# Patient Record
Sex: FEMALE | Race: OTHER | HISPANIC OR LATINO | Employment: FULL TIME | URBAN - METROPOLITAN AREA
[De-identification: names, ages, dates, MRNs, and addresses within clinical notes are randomized per-mention and may not be internally consistent; named-entity substitution may affect disease eponyms.]

---

## 2018-01-12 NOTE — RESULT NOTES
Verified Results  (1) TSH [id number] 02:23PM Judy Diehl   Patients undergoing fluorescein dye angiography may retain small amounts of fluorescein in the body for 48-72 hours post procedure  Samples containing fluorescein can produce falsely depressed TSH values  If the patient had this procedure,a specimen should be resubmitted post fluorescein clearance  The recommended reference ranges for TSH during pregnancy are as follows:  First trimester 0 1 to 2 5 uIU/mL  Second trimester  0 2 to 3 0 uIU/mL  Third trimester 0 3 to 3 0 uIU/m     Test Name Result Flag Reference   TSH 2 260 uIU/mL  0 358-3 740     (1) CBC/PLT/DIFF [id number] 02:23PM Judy Diehl     Test Name Result Flag Reference   WBC COUNT 8 53 Thousand/uL  4 31-10 16   RBC COUNT 4 41 Million/uL  3 81-5 12   HEMOGLOBIN 12 2 g/dL  11 5-15 4   HEMATOCRIT 38 5 %  34 8-46  1   MCV 87 fL  82-98   MCH 27 7 pg  26 8-34 3   MCHC 31 7 g/dL  31 4-37 4   RDW 12 8 %  11 6-15 1   MPV 9 8 fL  8 9-12 7   PLATELET COUNT 731 Thousands/uL H 149-390   nRBC AUTOMATED 0 /100 WBCs     NEUTROPHILS RELATIVE PERCENT 62 %  43-75   LYMPHOCYTES RELATIVE PERCENT 30 %  14-44   MONOCYTES RELATIVE PERCENT 7 %  4-12   EOSINOPHILS RELATIVE PERCENT 1 %  0-6   BASOPHILS RELATIVE PERCENT 0 %  0-1   NEUTROPHILS ABSOLUTE COUNT 5 27 Thousands/µL  1 85-7 62   LYMPHOCYTES ABSOLUTE COUNT 2 52 Thousands/µL  0 60-4 47   MONOCYTES ABSOLUTE COUNT 0 57 Thousand/µL  0 17-1 22   EOSINOPHILS ABSOLUTE COUNT 0 12 Thousand/µL  0 00-0 61   BASOPHILS ABSOLUTE COUNT 0 03 Thousands/µL  0 00-0 10

## 2018-03-15 PROCEDURE — 87624 HPV HI-RISK TYP POOLED RSLT: CPT | Performed by: OBSTETRICS & GYNECOLOGY

## 2018-03-15 PROCEDURE — G0145 SCR C/V CYTO,THINLAYER,RESCR: HCPCS | Performed by: OBSTETRICS & GYNECOLOGY

## 2018-03-17 ENCOUNTER — LAB REQUISITION (OUTPATIENT)
Dept: LAB | Facility: HOSPITAL | Age: 33
End: 2018-03-17
Payer: COMMERCIAL

## 2018-03-17 DIAGNOSIS — Z11.51 ENCOUNTER FOR SCREENING FOR HUMAN PAPILLOMAVIRUS (HPV): ICD-10-CM

## 2018-03-17 DIAGNOSIS — Z01.419 ENCOUNTER FOR GYNECOLOGICAL EXAMINATION WITHOUT ABNORMAL FINDING: ICD-10-CM

## 2018-03-20 LAB — HPV RRNA GENITAL QL NAA+PROBE: ABNORMAL

## 2018-03-21 LAB
LAB AP GYN PRIMARY INTERPRETATION: NORMAL
Lab: NORMAL

## 2019-06-28 ENCOUNTER — HOSPITAL ENCOUNTER (EMERGENCY)
Facility: HOSPITAL | Age: 34
Discharge: HOME | End: 2019-06-29
Attending: EMERGENCY MEDICINE
Payer: COMMERCIAL

## 2019-06-28 VITALS
TEMPERATURE: 97.7 F | RESPIRATION RATE: 18 BRPM | OXYGEN SATURATION: 97 % | BODY MASS INDEX: 31.15 KG/M2 | DIASTOLIC BLOOD PRESSURE: 75 MMHG | HEART RATE: 64 BPM | SYSTOLIC BLOOD PRESSURE: 126 MMHG | WEIGHT: 198.85 LBS

## 2019-06-28 DIAGNOSIS — M54.41 ACUTE RIGHT-SIDED LOW BACK PAIN WITH RIGHT-SIDED SCIATICA: Primary | ICD-10-CM

## 2019-06-28 LAB
BILIRUB UR QL STRIP: NEGATIVE
CLARITY UR: CLEAR
COLOR UR: YELLOW
EXT PREG TEST URINE: NEGATIVE
EXT. CONTROL ED NAV: NORMAL
GLUCOSE UR STRIP-MCNC: NEGATIVE MG/DL
HGB UR QL STRIP.AUTO: ABNORMAL
KETONES UR STRIP-MCNC: NEGATIVE MG/DL
LEUKOCYTE ESTERASE UR QL STRIP: NEGATIVE
NITRITE UR QL STRIP: NEGATIVE
PH UR STRIP.AUTO: 7 [PH] (ref 4.5–8)
PROT UR STRIP-MCNC: NEGATIVE MG/DL
SP GR UR STRIP.AUTO: 1.02 (ref 1–1.03)
UROBILINOGEN UR QL STRIP.AUTO: 0.2 E.U./DL

## 2019-06-28 PROCEDURE — 81003 URINALYSIS AUTO W/O SCOPE: CPT

## 2019-06-28 PROCEDURE — 87086 URINE CULTURE/COLONY COUNT: CPT | Performed by: EMERGENCY MEDICINE

## 2019-06-28 PROCEDURE — 96372 THER/PROPH/DIAG INJ SC/IM: CPT

## 2019-06-28 PROCEDURE — 99283 EMERGENCY DEPT VISIT LOW MDM: CPT

## 2019-06-28 PROCEDURE — 81001 URINALYSIS AUTO W/SCOPE: CPT

## 2019-06-28 PROCEDURE — 81025 URINE PREGNANCY TEST: CPT | Performed by: EMERGENCY MEDICINE

## 2019-06-28 RX ORDER — DIAZEPAM 5 MG/ML
5 INJECTION, SOLUTION INTRAMUSCULAR; INTRAVENOUS ONCE
Status: COMPLETED | OUTPATIENT
Start: 2019-06-28 | End: 2019-06-28

## 2019-06-28 RX ORDER — ACETAMINOPHEN 325 MG/1
975 TABLET ORAL ONCE
Status: COMPLETED | OUTPATIENT
Start: 2019-06-28 | End: 2019-06-28

## 2019-06-28 RX ORDER — LIDOCAINE 50 MG/G
1 PATCH TOPICAL ONCE
Status: DISCONTINUED | OUTPATIENT
Start: 2019-06-28 | End: 2019-06-29 | Stop reason: HOSPADM

## 2019-06-28 RX ORDER — KETOROLAC TROMETHAMINE 30 MG/ML
30 INJECTION, SOLUTION INTRAMUSCULAR; INTRAVENOUS ONCE
Status: COMPLETED | OUTPATIENT
Start: 2019-06-28 | End: 2019-06-28

## 2019-06-28 RX ADMIN — ACETAMINOPHEN 975 MG: 325 TABLET ORAL at 23:45

## 2019-06-28 RX ADMIN — DIAZEPAM 5 MG: 5 INJECTION, SOLUTION INTRAMUSCULAR; INTRAVENOUS at 23:48

## 2019-06-28 RX ADMIN — LIDOCAINE 1 PATCH: 50 PATCH TOPICAL at 23:45

## 2019-06-28 RX ADMIN — KETOROLAC TROMETHAMINE 30 MG: 30 INJECTION, SOLUTION INTRAMUSCULAR; INTRAVENOUS at 23:46

## 2019-06-29 LAB
BACTERIA UR QL AUTO: ABNORMAL /HPF
NON-SQ EPI CELLS URNS QL MICRO: ABNORMAL /HPF
RBC #/AREA URNS AUTO: ABNORMAL /HPF
WBC #/AREA URNS AUTO: ABNORMAL /HPF

## 2019-06-29 PROCEDURE — 99284 EMERGENCY DEPT VISIT MOD MDM: CPT | Performed by: EMERGENCY MEDICINE

## 2019-06-29 RX ORDER — METHOCARBAMOL 500 MG/1
500 TABLET, FILM COATED ORAL 2 TIMES DAILY
Qty: 20 TABLET | Refills: 0 | Status: SHIPPED | OUTPATIENT
Start: 2019-06-29

## 2019-06-29 RX ORDER — LIDOCAINE 40 MG/G
CREAM TOPICAL AS NEEDED
Qty: 30 G | Refills: 0 | Status: SHIPPED | OUTPATIENT
Start: 2019-06-29

## 2019-06-29 RX ORDER — LIDOCAINE 50 MG/G
1 PATCH TOPICAL DAILY
Qty: 30 PATCH | Refills: 0 | Status: SHIPPED | OUTPATIENT
Start: 2019-06-29

## 2019-06-29 RX ORDER — NAPROXEN 500 MG/1
500 TABLET ORAL 2 TIMES DAILY WITH MEALS
Qty: 30 TABLET | Refills: 0 | Status: SHIPPED | OUTPATIENT
Start: 2019-06-29

## 2019-06-29 NOTE — ED PROVIDER NOTES
History  Chief Complaint   Patient presents with    Back Pain     patient c/o lower back pain since tuesday, no injury or trauma noted  Last dose of motrin at 12pm      Pt is a 27year old female with a PMH of thyroid carcinoma presenting with low back pain x 4 days  Pt denies injury but has had severe 10/10 right sided low back pain that radiates to her right leg  She denies paresthesias  Denies fevers, chills, IV drug abuse, loss of bowel or bladder, saddle anesthesia  She has been taking Tylenol without relief  States she is active at work and this has been worsening her pain  Denies urinary symptoms  Denies weight loss  None       Past Medical History:   Diagnosis Date    Thyroid cancer [facility])        Past Surgical History:   Procedure Laterality Date    THYROIDECTOMY, PARTIAL  2017       Family History   Problem Relation Age of Onset    Prostate cancer Father     Depression Father     Diabetes Maternal Grandfather     Diabetes Paternal Grandfather      I have reviewed and agree with the history as documented  Social History     Tobacco Use    Smoking status: Never Smoker    Smokeless tobacco: Never Used   Substance Use Topics    Alcohol use: Not Currently    Drug use: Never        Review of Systems   Constitutional: Negative for chills, diaphoresis, fever and unexpected weight change  Respiratory: Negative for shortness of breath  Cardiovascular: Negative for chest pain  Gastrointestinal: Negative for diarrhea, nausea and vomiting  Genitourinary: Negative  Musculoskeletal: Positive for back pain  Negative for gait problem, neck pain and neck stiffness  Neurological: Negative for dizziness, weakness, light-headedness, numbness and headaches  Physical Exam  Physical Exam   Constitutional: She is oriented to person, place, and time  She appears well-developed and well-nourished  She appears distressed  HENT:   Head: Normocephalic and atraumatic     Eyes: Pupils are equal, round, and reactive to light  Conjunctivae and EOM are normal    Neck: Normal range of motion  Neck supple  Cardiovascular: Normal rate, regular rhythm, normal heart sounds and intact distal pulses  Pulses:       Dorsalis pedis pulses are 2+ on the right side  Pulmonary/Chest: Effort normal and breath sounds normal    Abdominal: Soft  Bowel sounds are normal  She exhibits no distension  There is no tenderness  Musculoskeletal:        Lumbar back: She exhibits decreased range of motion, tenderness and pain  She exhibits no bony tenderness, no swelling, no edema, no deformity, no laceration, no spasm and normal pulse  Back:    Neurological: She is alert and oriented to person, place, and time  No sensory deficit  Strength RLE 2/5 compared to 4/5 LLE  Sensation in tact b/l  Skin: Skin is warm and dry  Capillary refill takes less than 2 seconds  She is not diaphoretic         Vital Signs  ED Triage Vitals   Temperature Pulse Respirations Blood Pressure SpO2   06/28/19 2239 06/28/19 2239 06/28/19 2239 06/28/19 2243 06/28/19 2239   97 7 °F (36 5 °C) 64 18 126/75 97 %      Temp Source Heart Rate Source Patient Position - Orthostatic VS BP Location FiO2 (%)   06/28/19 2239 06/28/19 2239 06/28/19 2239 06/28/19 2239 --   Oral Monitor Sitting Left arm       Pain Score       06/28/19 2239       Worst Possible Pain           Vitals:    06/28/19 2239 06/28/19 2243   BP:  126/75   Pulse: 64    Patient Position - Orthostatic VS: Sitting          Visual Acuity      ED Medications  Medications   lidocaine (LIDODERM) 5 % patch 1 patch (1 patch Topical Medication Applied 6/28/19 2345)   ketorolac (TORADOL) injection 30 mg (30 mg Intramuscular Given 6/28/19 2346)   acetaminophen (TYLENOL) tablet 975 mg (975 mg Oral Given 6/28/19 2345)   diazepam (VALIUM) injection 5 mg (5 mg Intramuscular Given 6/28/19 2348)       Diagnostic Studies  Results Reviewed     Procedure Component Value Units Date/Time    Urine culture [[id number]] Collected:  06/28/19 2350    Lab Status: In process Specimen:  Urine Updated:  06/29/19 0034    Urine Microscopic [[id number]]  (Abnormal) Collected:  06/28/19 2350    Lab Status:  Final result Specimen:  Urine, Clean Catch Updated:  06/29/19 0009     RBC, UA 20-30 /hpf      WBC, UA None Seen /hpf      Epithelial Cells Occasional /hpf      Bacteria, UA Occasional /hpf     POCT urinalysis dipstick [[id number]]  (Abnormal) Resulted:  06/28/19 2344    Lab Status:  Final result Specimen:  Urine Updated:  06/28/19 2344    POCT pregnancy, urine [[id number]]  (Normal) Resulted:  06/28/19 2343    Lab Status:  Final result Updated:  06/28/19 2343     EXT PREG TEST UR (Ref: Negative) negative     Control valid    ED Urine Macroscopic [[id number]]  (Abnormal) Collected:  06/28/19 2350    Lab Status:  Final result Specimen:  Urine Updated:  06/28/19 2339     Color, UA Yellow     Clarity, UA Clear     pH, UA 7 0     Leukocytes, UA Negative     Nitrite, UA Negative     Protein, UA Negative mg/dl      Glucose, UA Negative mg/dl      Ketones, UA Negative mg/dl      Urobilinogen, UA 0 2 E U /dl      Bilirubin, UA Negative     Blood, UA Moderate     Specific Gravity, UA 1 025    Narrative:       [facility] RESULT                 No orders to display              Procedures  Procedures       ED Course                               MDM  Number of Diagnoses or Management Options  Acute right-sided low back pain with right-sided sciatica:   Diagnosis management comments: Symptoms consistent with sciatica  Comprehensive spine program, supportive care and follow up with PCP  Educated on return precautions  Stable and ready for discharge        Disposition  Final diagnoses:   Acute right-sided low back pain with right-sided sciatica     Time reflects when diagnosis was documented in both MDM as applicable and the Disposition within this note     Time User Action Codes Description Comment    6/29/2019 12:07 AM Patrica ECHEVARRIA Add [G76 61] Acute right-sided low back pain with right-sided sciatica       ED Disposition     ED Disposition Condition Date/Time Comment    Discharge Good Sat Jun 29, 2019 12:07 AM Sigifredo Hagan discharge to home/self care              Follow-up Information     Follow up With Specialties Details Why Contact Info    Judy Diehl PA-C Family Medicine, Physician Assistant Schedule an appointment as soon as possible for a visit  As needed Janina Arsenio [id number] [facility]  [phone]            Discharge Medication List as of 6/29/2019 12:09 AM      START taking these medications    Details   lidocaine (LIDODERM) 5 % Apply 1 patch topically daily Remove & Discard patch within 12 hours or as directed by MD, Starting Sat 6/29/2019, Print      lidocaine (LMX) 4 % cream Apply topically as needed for moderate pain, Starting Sat 6/29/2019, Print      methocarbamol (ROBAXIN) 500 mg tablet Take 1 tablet (500 mg total) by mouth 2 (two) times a day, Starting Sat 6/29/2019, Print      naproxen (NAPROSYN) 500 mg tablet Take 1 tablet (500 mg total) by mouth 2 (two) times a day with meals, Starting Sat 6/29/2019, Print               ED Provider  Electronically Signed by           Mp Buchanan PA-C  06/29/19 2793

## 2019-06-29 NOTE — DISCHARGE INSTRUCTIONS
Ciática   LO QUE NECESITA SABER:   La ciática es cosmo condición que causa dolor en el nervio ciático  El nervio ciático sale de la pat dorsal y corre por ambos lados de los glúteos  Luego baja por la parte posterior del muslo, la parte inferior de la pierna y el pie  El nervio ciático puede estar comprimido, [place], irritado o estirado  INSTRUCCIONES SOBRE EL [facility]ITAL[facility]:   Medicamentos:   · AINEs (analgésicos antiinflamatorios no esteroides):  Estos medicamentos disminuyen la inflamación y el dolor  Los AINEs se pueden obtener sin receta médica  Consulte con hardwick médico cuál medicamento es el adecuado para usted  Pregunte cuánto kyle y cuándo  Tómelos cem se le indique  Cuando no se shruthi de la [employer], los medicamentos antiinflamatorios no esteroides pueden causar sangrado estomacal o problemas renales  · Acetaminofeno:  Ors medicamento disminuye el dolor  El acetaminofeno puede obtener sin receta médica  Pregunte cuánto kyle y cuándo  [address]  El acetaminofén puede causar daño en el hígado cuando no se darwin de forma correcta  · Relajantes musculares  ayudan a reducir dolor y espasmos musculares  · [place] ruth ann medicamentos cem se le haya indicado  Consulte con hardwick médico si usted polly que hardwick medicamento no le está ayudando o si presenta efectos secundarios  Infórmele si es alérgico a algún medicamento  Mantenga cosmo lista actualizada de los [place], las vitaminas y los productos herbales que darwin  Incluya los siguientes datos de los medicamentos: cantidad, frecuencia y motivo de administración  Traiga con usted la lista o los envases de la píldoras a ruth ann citas de seguimiento  Lleve la lista de los medicamentos con usted en kelly de cosmo emergencia  Acuda a ruth ann consultas de control con hardwick médico según le indicaron  Anote ruth ann preguntas para que se acuerde de hacerlas ivette ruth ann visitas  El [facility] síntomas:   · Actividad:  Reduzca ruth ann actividades   No levante objetos pesados ni tuerza la espalda ivette un mínimo de 6 semanas  Retome lentamente ruth ann actividades acostumbradas  · Hielo:  El hielo ayuda a disminuir la inflamación y el dolor  El hielo también puede contribuir a evitar el daño de los tejidos  Use un paquete de hielo o ponga hielo molido dentro de [employer]  Cúbrala con cosmo toalla y colóquela en la pierna por 15 a 20 minutos cada hora o cem se le indique  · Calor:  El calor ayuda a disminuir el dolor y los espasmos musculares  Aplíquese calor en el área lesionada ivette 20 a 30 minutos cada 2 horas ivette la cantidad de [employer] indiquen  · Fisioterapia:  Es posible que deba atenderse con un fisioterapeuta para que le enseñe ejercicios para aumentar la fuerza y el movimiento y aliviar el dolor  Un terapeuta ocupacional le enseña habilidades para ayudarlo con ruth ann actividades diarias  · Use los dispositivos de asistencia dick cem le indiquen:  Es posible que deba usar un soporte para la espalda, cem cosmo Haley Harpin  Puede que necesite muletas, un bastón o un andador para disminuir la presión en los músculos de la parte inferior de la espalda y las piernas  Pregunte a hardwick médico por más [employer] dispositivos de asistencia y cómo se usan de forma correcta  Cuidados personales:   · Evite la presión en la espalda y las piernas:  No  levante objetos pesados, ni esté de pie o sentado por períodos prolongados de tiempo  · Levante los objetos de [place] mahajan: Mantenga la Malka Reels y doble las rodillas para alzar un objeto  No doble ni tuerza la espalda cuando levante algo  · Mantenga un peso saludable:  Consulte con hardwick médico cuánto debería pesar  Pida que le ayude a crear un plan para bajar de peso si usted tiene sobrepeso  · Ejercicio:  Pídale a hardwick médico que le recomiende el programa de estiramiento, calentamiento y ejercicio más apropiado para usted    Pregúntele a hardwick [place] vitaminas y minerales son adecuados para usted  · Le duele la parte inferior de la espalda por la noche o cuando descansa  · Le duele la parte inferior de la espalda y se le adormece la pierna por debajo de la rodilla  · Tiene debilidad en cosmo pierna solamente  · Usted tiene preguntas o inquietudes acerca de beal condición o cuidado  Regrese a la scott de emergencias si:   · Tiene dificultad para contener la orina o las evacuaciones intestinales  · Tiene debilidad en ambas piernas  · Pierde la sensación en la trudy o los glúteos  © 2017 [address] [employer] Information is for End User's use only and may not be sold, redistributed or otherwise used for commercial purposes  All illustrations and images included in CareNotes® are the copyrighted property of A D A M , Inc  or Say Beard  Esta información es sólo para uso en educación  Beal intención no es darle un consejo médico sobre enfermedades o tratamientos  Colsulte con beal Carline Sinha farmacéutico antes de seguir cualquier régimen médico para saber si es seguro y efectivo para usted  Ejercicios para la espalda baja   LO QUE NECESITA SABER:   Los ejercicios de la espalda baja ayudan a sanar y a fortalecer los músculos de beal espalda para evitar otra lesión  Pregúntele a beal médico si usted necesita acudir con un fisioterapeuta para que le indique ejercicios más avanzado  INSTRUCCIONES SOBRE EL [facility]ITAL[facility]IA:   Regrese a la scott de emergencias si:   · Usted tiene dolor severo que le impide moverse  Pregúntele a beal Graeme Fore[place] vitaminas y minerales son adecuados para usted  · Beal dolor empeora  · Usted tiene un dolor nuevo  · Usted tiene preguntas o inquietudes acerca de beal condición o cuidado  Realice los ejercicios para la espalda baja de manera mahajan:   · Edilosn ruth ann ejercicios sobre cosmo colchoneta o superficie firme  (no en la cama) para uday soporte a la columna y evitar dolor en la parte baja de la espalda  · Muévase lenta y suavemente  Evite movimientos rápidos o bruscos  · Respire normalmente  No contenga la respiración  · Deténgase si siente dolor  Es normal que sienta cierta molestia al principio  Practicar los ejercicios con regularidad ayudará a disminuir hardwick incomodidad con el paso del [place]  Ejercicios para la espalda baja: Hardwick médico podría recomendarle que realice ejercicios para la espalda de 10 a 30 minutos cada día  También podría recomendarle que mercedez ejercicios 1 a 3 veces cada día  Pregunte a hardwick médico cuáles ejercicios son los mejores para usted y con qué frecuencia hacerlos  · Bombeo del tobillo:  Acuéstese boca arriba  Levante hardwick pie (con ruth ann dedos apuntando hacia hardwick humaira)  Luego, baje hardwick pie (con los dedos apuntando lejos de usted)  Repita soco ejercicio 10 veces en cada lado  · Deslizamiento de talón:  Acuéstese boca arriba  Muy despacio doble cosmo pierna y luego enderécela  Luego, doble la otra pierna y enderécela  Repita 10 veces en cada lado  · Inclinación pélvica:  Acuéstese boca arriba con ruth ann rodillas dobladas y ruth ann pies planos sobre el piso  Coloque ruth ann brazos en cosmo posición relajada junto a hardwick cuerpo  Contraiga los músculos de hardwick abdomen y aplane hardwick espalda contra el piso  Sostenga está posición por 5 segundos  Repita 5 veces  · Estiramiento de la espalda:  Acuéstese boca arriba con ruth ann stevan detrás de hardwick humaira  Doble ruth ann rodillas y gire la mitad de hardwick cuerpo hacia un lado  Mantenga esta posición por 10 segundos  Repita 3 veces en cada lado  · Levantamiento de la pierna estirada:  Acuéstese boca [place] con cosmo pierna estirada  Doble la otra rodilla  Contraiga hardwick abdomen y luego levante lentamente la pierna estirada entre 6 a 12 pulgadas del piso  Mantenga esta posición por 1 a 5 segundos  Baje hardwick pierna lentamente  Repita 10 veces en cada pierna             · Rodillas al pecho:  Acuéstese boca arriba con ruth ann rodillas dobladas y ruth ann pies planos sobre el piso  [place] cosmo de las rodillas hacia hardwick pecho y sosténgala por 5 segundos  Regrese hardwick pierna a la posición inicial  Levante la otra rodilla hacia el pecho y sosténgala por 5 segundos  Edilson esto 5 veces en cada lado  · Posición wesly camello:  Coloque ruth ann stevan y [employer]  Arquee hardwick espalda Thedore Limon, hacia el techo y [facility]  Arquee hardwick pat dorsal lo más posible  Sostenga está posición por 5 segundos  Levante hardwick humaira hacia arriba y baje hardwick pecho hacia el piso  Sostenga está posición por 5 segundos  Edilson 3 series o cem se le indique  · Posición de cuclillas contra la pared:  Párese con hardwick espalda contra la pared  Contraiga los músculos de hardwick abdomen  Lentamente deslice hardwick cuerpo hasta que ruth ann rodillas queden dobladas en un ángulo de 45 grados  Mantenga esta posición por 5 segundos  Deslice lentamente hardwick espalda hacia arriba hasta quedar de pie  Repita 10 veces  · Posición de acurrucarse:  Acuéstese boca arriba con ruth ann rodillas dobladas y ruth ann pies planos sobre el piso  Coloque ruth ann stevan con las wali hacia abajo debajo de la curva de la parte baja de hardwick espalda  Después, con ruth ann codos [employer], levante ruth ann hombros y [place] 2 a 3 pulgadas  Mantenga hardwick humaira a la misma altura de ruth ann hombros  Mantenga esta posición por 5 segundos  Cuando usted pueda hacer soco ejercicio sin sentir dolor por 10 a 15 segundos, puede entonces añadir cosmo rotación  Mientras ruth ann hombros y hardwick pecho estén levantados del piso, voltee levemente hacia la izquierda y VALENTINE  Repita en el otro lado  · Ejercicio pájaro renee:  Coloque ruth ann stevan y rodillas sobre el piso  Mantenga ruth ann muñecas directamente debajo de ruth ann hombros y ruth ann rodillas directamente debajo de ruth ann caderas  Contraiga hardwick ombligo hacia adentro en dirección a hardwick columna  No estire ni arquee hardwick espalda  Ponga tensos ruth ann músculos abdominales   Levante un brazo extendido para que se alinee con beal humaira  Luego, levante la pierna opuesta a beal brazo  Mantenga esta posición por 15 segundos  Baje beal Kayleigh Jese y asiya lentamente y Flores de benitoo  Edilson 5 series  © 2017 [address] [employer] Information is for End User's use only and may not be sold, redistributed or otherwise used for commercial purposes  All illustrations and images included in CareNotes® are the copyrighted property of A D A M , Inc  or Say Beard  Esta información es sólo para uso en educación  Beal intención no es darle un consejo médico sobre enfermedades o tratamientos  Colsulte con beal Stormy Binder farmacéutico antes de seguir cualquier régimen médico para saber si es seguro y efectivo para usted  Dolor graciela de espalda inferior   LO QUE NECESITA SABER:   El dolor graciela de la región lumbar de la espalda es cosmo molestia repentina en la parte inferior de beal espalda que dura hasta por 6 semanas  La molestia hace que sea dificil que usted tolere la [place]  INSTRUCCIONES SOBRE EL [facility]ITAL[facility]IA:   Regrese a la scott de emergencias si:   · Usted tiene dolor intenso  · Usted repentinamente tiene rigidez o siente pesadez en ambos glúteos hacia abajo de ambas piernas  · Usted tiene entumecimiento o debilidad en cosmo pierna o dolor en ambas piernas  · Usted tiene entumecimiento en el área genital o en la región lumbar  · Usted no puede controlar beal orina ni ruth ann deposiciones intestinales  Pregúntele a beal Samule Rode vitaminas y minerales son adecuados para usted  · Usted tiene fiebre  · Usted tiene un dolor por la noche o cuando descansa  · Beal dolor no mejora con el tratamiento  · Usted tiene dolor que empeora cuando tose o estornuda  · Usted siente un estallido o chasquido repentino en beal espalda  · Usted tiene preguntas o inquietudes acerca de beal condición o cuidado    Medicamentos:  Los siguientes medicamentos pueden  ser recetados por beal médico:  · El acetaminofén jonah el dolor  Está disponible sin receta médica  Pregunte la cantidad y la frecuencia con que debe tomarlos  [address]  El acetaminofén puede causar daño en el hígado cuando no se darwin de forma correcta  · AINEs (Analgésicos antiinflamatorios no esteroides)  ayudan a disminuir la inflamación y el dolor  Ros medicamento esta disponible con o sin cosmo receta médica  Los AINEs pueden causar sangrado estomacal o problemas renales en ciertas personas  Si usted darwin un medicamento anticoagulante, siempre pregúntele a hardwick médico si los JESSICA son seguros para usted  Siempre bjorn la etiqueta de ros medicamento y [place] instrucciones  · Un medicamento con receta para el dolor  podrían ser Jacobo Resides  Pregunte al médico cómo debe kyle ros medicamento de forma mahajan  · Relajantes musculares  disminuyen el dolor y [employer] músculos de la parte inferior de la columna  · [place] ruth ann medicamentos cem se le haya indicado  Consulte con hardwick médico si usted polly que hardwick medicamento no le está ayudando o si presenta efectos secundarios  Infórmele si es alérgico a algún medicamento  Mantenga cosmo lista actualizada de los [place], las vitaminas y los productos herbales que darwin  Incluya los siguientes datos de los medicamentos: cantidad, frecuencia y motivo de administración  Traiga con usted la lista o los envases de la píldoras a ruth ann citas de seguimiento  Lleve la lista de los medicamentos con usted en kelly de cosmo emergencia  Cuidados personales:   · Manténgase activo  lo más que pueda sin causar más dolor  El reposo en cama puede empeorar hardwick dolor de espalda  Comience con ejercicios ligeros cem caminar  Evite levantar objetos hasta que ya no tenga dolor  Solicite más información acerca de las actividades físicas o plan de ejercicios que son los adecuados para usted  · El hielo  ayuda a disminuir la inflamación, el dolor y los espasmos musculares  Ponga hielo jie en cosmo bolsa plástica  Cúbrala con cosmo toalla  Aplíquela en hardwick harman lumbar por 20 a 30 minutos cada 2 horas  Edilson esto por 2 a 3 días después que el dolor empiece, o según lo indicado  · El calor  ayuda a disminuir dolor y espasmos musculares  Empiece a utilizar calor después de michael terminado el tratamiento con el hielo  Utilice cosmo toalla pequeña empapada con [place], cosmo almohada térmica o tome un baño de david con agua tibia  Aplíquese calor en el área lesionada ivette 20 a 30 minutos cada 2 horas ivette la cantidad de [employer] indiquen  [place] entre el calor y el hielo  Prevenir el dolor graciela de la parte inferior de la espalda:   · Use la mecánica corporal adecuada  ¨ Flexione la cadera y las rodillas cuando Sabiha Debar a levantar un objeto  No doble la cintura  Utilice los [employer] de las piernas mientras levanta hardwick carga  No use [place]iwck espalda  Mantenga el objeto cerca de hardwick pecho mientras lo levanta  No se tuerza, ni levante cualquier cosa por encima de hardwick cintura  ¨ [place] hardwick posición frecuentemente cuando pase mucho tiempo de pie  Descanse un pie sobre cosmo Theador Faye o un reposapiés e intercambie con el otro pie frecuentemente  ¨ No permanezca sentado por lapsos de tiempo prolongados  Cuando sea necesario hacerlo, siéntese en cosmo silla de respaldo recto con los pies apoyados en el suelo  Nunca alcance, jale ni empuje mientras se encuentra sentando  · Edilson ejercicios que fortalezcan ruth ann músculos de la espalda  Entre en calor antes de hacer ejercicio  Consulte con hardwick médico sobre [employer] plan de ejercicios para usted  · Mantenga un peso saludable  Consulte con hardwick médico cuánto debería pesar  Pida que le ayude a crear un plan para bajar de peso si usted tiene sobrepeso  Acuda a ruth ann consultas de control con hardwick médico según le indicaron  Regrese a cosmo srinivas de seguimiento si usted aun tiene [employer] de 1 a 3 semanas de [place]   Puede que usted necesite acudir con un ortopedista si hardwick dolor de espalda dura más de 12 semanas  Anote ruth ann preguntas para que se acuerde de hacerlas ivette ruth ann visitas  © 2017 [address] [employer] Information is for End User's use only and may not be sold, redistributed or otherwise used for commercial purposes  All illustrations and images included in CareNotes® are the copyrighted property of A ERIBERTO A M , Inc  or Say Beard  Esta información es sólo para uso en educación  Beal intención no es darle un consejo médico sobre enfermedades o tratamientos  Colsulte con beal Dorna Richard farmacéutico antes de seguir cualquier régimen médico para saber si es seguro y efectivo para usted

## 2019-06-30 LAB — BACTERIA UR CULT: NORMAL

## 2019-07-16 ENCOUNTER — TELEPHONE (OUTPATIENT)
Dept: PHYSICAL THERAPY | Facility: OTHER | Age: 34
End: 2019-07-16

## 2019-07-16 NOTE — TELEPHONE ENCOUNTER
Nurse left message in English to return call to [facility] ([facility]) comprehensive spine program  Patients phone greeting is in [place]  Referral closed

## 2020-02-05 ENCOUNTER — TELEPHONE (OUTPATIENT)
Dept: NEUROLOGY | Facility: CLINIC | Age: 35
End: 2020-02-05

## 2020-03-05 ENCOUNTER — HOSPITAL ENCOUNTER (EMERGENCY)
Facility: HOSPITAL | Age: 35
Discharge: HOME | End: 2020-03-05
Attending: EMERGENCY MEDICINE | Admitting: EMERGENCY MEDICINE
Payer: COMMERCIAL

## 2020-03-05 VITALS
HEART RATE: 65 BPM | RESPIRATION RATE: 20 BRPM | TEMPERATURE: 98.5 F | OXYGEN SATURATION: 98 % | SYSTOLIC BLOOD PRESSURE: 108 MMHG | DIASTOLIC BLOOD PRESSURE: 53 MMHG

## 2020-03-05 DIAGNOSIS — N93.9 VAGINAL BLEEDING: Primary | ICD-10-CM

## 2020-03-05 DIAGNOSIS — O20.0 THREATENED MISCARRIAGE: ICD-10-CM

## 2020-03-05 LAB
ABO GROUP BLD: NORMAL
ALBUMIN SERPL BCP-MCNC: 3.4 G/DL (ref 3.5–5)
ALP SERPL-CCNC: 37 U/L (ref 46–116)
ALT SERPL W P-5'-P-CCNC: 19 U/L (ref 12–78)
ANION GAP SERPL CALCULATED.3IONS-SCNC: 9 MMOL/L (ref 4–13)
AST SERPL W P-5'-P-CCNC: 15 U/L (ref 5–45)
B-HCG SERPL-ACNC: ABNORMAL MIU/ML
BACTERIA UR QL AUTO: ABNORMAL /HPF
BASOPHILS # BLD AUTO: 0.03 THOUSANDS/ΜL (ref 0–0.1)
BASOPHILS NFR BLD AUTO: 0 % (ref 0–1)
BILIRUB SERPL-MCNC: 0.28 MG/DL (ref 0.2–1)
BILIRUB UR QL STRIP: NEGATIVE
BUN SERPL-MCNC: 10 MG/DL (ref 5–25)
CALCIUM SERPL-MCNC: 8.9 MG/DL (ref 8.3–10.1)
CHLORIDE SERPL-SCNC: 104 MMOL/L (ref 100–108)
CLARITY UR: CLEAR
CO2 SERPL-SCNC: 24 MMOL/L (ref 21–32)
COLOR UR: YELLOW
CREAT SERPL-MCNC: 0.65 MG/DL (ref 0.6–1.3)
EOSINOPHIL # BLD AUTO: 0.06 THOUSAND/ΜL (ref 0–0.61)
EOSINOPHIL NFR BLD AUTO: 1 % (ref 0–6)
ERYTHROCYTE [DISTWIDTH] IN BLOOD BY AUTOMATED COUNT: 12.6 % (ref 11.6–15.1)
GFR SERPL CREATININE-BSD FRML MDRD: 115 ML/MIN/1.73SQ M
GLUCOSE SERPL-MCNC: 76 MG/DL (ref 65–140)
GLUCOSE UR STRIP-MCNC: NEGATIVE MG/DL
HCT VFR BLD AUTO: 36.2 % (ref 34.8–46.1)
HGB BLD-MCNC: 11.9 G/DL (ref 11.5–15.4)
HGB UR QL STRIP.AUTO: ABNORMAL
IMM GRANULOCYTES # BLD AUTO: 0.05 THOUSAND/UL (ref 0–0.2)
IMM GRANULOCYTES NFR BLD AUTO: 0 % (ref 0–2)
KETONES UR STRIP-MCNC: NEGATIVE MG/DL
LEUKOCYTE ESTERASE UR QL STRIP: NEGATIVE
LYMPHOCYTES # BLD AUTO: 1.6 THOUSANDS/ΜL (ref 0.6–4.47)
LYMPHOCYTES NFR BLD AUTO: 13 % (ref 14–44)
MCH RBC QN AUTO: 30.1 PG (ref 26.8–34.3)
MCHC RBC AUTO-ENTMCNC: 32.9 G/DL (ref 31.4–37.4)
MCV RBC AUTO: 92 FL (ref 82–98)
MONOCYTES # BLD AUTO: 0.81 THOUSAND/ΜL (ref 0.17–1.22)
MONOCYTES NFR BLD AUTO: 7 % (ref 4–12)
MUCOUS THREADS UR QL AUTO: ABNORMAL
NEUTROPHILS # BLD AUTO: 9.7 THOUSANDS/ΜL (ref 1.85–7.62)
NEUTS SEG NFR BLD AUTO: 79 % (ref 43–75)
NITRITE UR QL STRIP: NEGATIVE
NON-SQ EPI CELLS URNS QL MICRO: ABNORMAL /HPF
NRBC BLD AUTO-RTO: 0 /100 WBCS
PH UR STRIP.AUTO: 6 [PH] (ref 4.5–8)
PLATELET # BLD AUTO: 281 THOUSANDS/UL (ref 149–390)
PMV BLD AUTO: 9.4 FL (ref 8.9–12.7)
POTASSIUM SERPL-SCNC: 3.8 MMOL/L (ref 3.5–5.3)
PROT SERPL-MCNC: 7.1 G/DL (ref 6.4–8.2)
PROT UR STRIP-MCNC: NEGATIVE MG/DL
RBC # BLD AUTO: 3.95 MILLION/UL (ref 3.81–5.12)
RBC #/AREA URNS AUTO: ABNORMAL /HPF
RH BLD: POSITIVE
SODIUM SERPL-SCNC: 137 MMOL/L (ref 136–145)
SP GR UR STRIP.AUTO: >=1.03 (ref 1–1.03)
UROBILINOGEN UR QL STRIP.AUTO: 0.2 E.U./DL
WBC # BLD AUTO: 12.25 THOUSAND/UL (ref 4.31–10.16)
WBC #/AREA URNS AUTO: ABNORMAL /HPF

## 2020-03-05 PROCEDURE — 86901 BLOOD TYPING SEROLOGIC RH(D): CPT | Performed by: EMERGENCY MEDICINE

## 2020-03-05 PROCEDURE — 86900 BLOOD TYPING SEROLOGIC ABO: CPT | Performed by: EMERGENCY MEDICINE

## 2020-03-05 PROCEDURE — 99284 EMERGENCY DEPT VISIT MOD MDM: CPT | Performed by: EMERGENCY MEDICINE

## 2020-03-05 PROCEDURE — 85025 COMPLETE CBC W/AUTO DIFF WBC: CPT | Performed by: EMERGENCY MEDICINE

## 2020-03-05 PROCEDURE — 99284 EMERGENCY DEPT VISIT MOD MDM: CPT

## 2020-03-05 PROCEDURE — 36415 COLL VENOUS BLD VENIPUNCTURE: CPT | Performed by: EMERGENCY MEDICINE

## 2020-03-05 PROCEDURE — 76815 OB US LIMITED FETUS(S): CPT | Performed by: EMERGENCY MEDICINE

## 2020-03-05 PROCEDURE — 80053 COMPREHEN METABOLIC PANEL: CPT | Performed by: EMERGENCY MEDICINE

## 2020-03-05 PROCEDURE — 84702 CHORIONIC GONADOTROPIN TEST: CPT | Performed by: EMERGENCY MEDICINE

## 2020-03-05 PROCEDURE — 81001 URINALYSIS AUTO W/SCOPE: CPT

## 2020-03-05 RX ORDER — ACETAMINOPHEN 325 MG/1
650 TABLET ORAL ONCE
Status: COMPLETED | OUTPATIENT
Start: 2020-03-05 | End: 2020-03-05

## 2020-03-05 RX ORDER — UREA 10 %
800 LOTION (ML) TOPICAL DAILY
COMMUNITY

## 2020-03-05 RX ORDER — PHENOL 1.4 %
AEROSOL, SPRAY (ML) MUCOUS MEMBRANE AS NEEDED
COMMUNITY

## 2020-03-05 RX ADMIN — ACETAMINOPHEN 650 MG: 325 TABLET ORAL at 13:52

## 2020-03-05 NOTE — ED PROVIDER NOTES
History  Chief Complaint   Patient presents with    Vaginal Bleeding - Pregnant     Patient states she is having vaginal bleeding similar to as heavy as a period  Reports she is currently 11 weeks pregnant  History provided by:  Patient   used: No    Vaginal Bleeding   Quality:  Spotting  Severity:  Moderate  Onset quality:  Gradual  Duration:  1 hour  Timing:  Intermittent  Progression:  Waxing and waning  Chronicity:  New  Menstrual history:  Irregular  Number of pads used:  None  Possible pregnancy: yes (11 weeks Pregnant, )    Context: spontaneously    Relieved by:  Nothing  Worsened by:  Nothing  Ineffective treatments:  None tried  Associated symptoms: no abdominal pain, no back pain, no dizziness, no dysuria, no fever and no nausea    Risk factors: no prior miscarriage        Prior to Admission Medications   Prescriptions Last Dose Informant Patient Reported? Taking?    Melatonin 10 MG TABS 3/4/2020 at Unknown time  Yes Yes   Sig: Take by mouth as needed   Prenatal Vit-Fe Fumarate-FA (PRENATAL PO) 3/5/2020 at Unknown time  Yes Yes   Sig: Take by mouth daily   folic acid (FOLVITE) 137 MCG tablet 3/5/2020 at Unknown time  Yes Yes   Sig: Take 800 mcg by mouth daily   lidocaine (LIDODERM) 5 % Not Taking at Unknown time  No No   Sig: Apply 1 patch topically daily Remove & Discard patch within 12 hours or as directed by MD   Patient not taking: Reported on 3/5/2020   lidocaine (LMX) 4 % cream Not Taking at Unknown time  No No   Sig: Apply topically as needed for moderate pain   Patient not taking: Reported on 3/5/2020   methocarbamol (ROBAXIN) 500 mg tablet Not Taking at Unknown time  No No   Sig: Take 1 tablet (500 mg total) by mouth 2 (two) times a day   Patient not taking: Reported on 3/5/2020   naproxen (NAPROSYN) 500 mg tablet Not Taking at Unknown time  No No   Sig: Take 1 tablet (500 mg total) by mouth 2 (two) times a day with meals   Patient not taking: Reported on 3/5/2020 Facility-Administered Medications: None       Past Medical History:   Diagnosis Date    Thyroid cancer [facility])        Past Surgical History:   Procedure Laterality Date    THYROIDECTOMY, PARTIAL  2017       Family History   Problem Relation Age of Onset    Prostate cancer Father     Depression Father     Diabetes Maternal Grandfather     Diabetes Paternal Grandfather      I have reviewed and agree with the history as documented  E-Cigarette/Vaping     E-Cigarette/Vaping Substances     Social History     Tobacco Use    Smoking status: Never Smoker    Smokeless tobacco: Never Used   Substance Use Topics    Alcohol use: Not Currently    Drug use: Never       Review of Systems   Constitutional: Negative for chills and fever  HENT: Negative for facial swelling, sore throat and trouble swallowing  Eyes: Negative for pain and visual disturbance  Respiratory: Negative for cough, chest tightness and shortness of breath  Cardiovascular: Negative for chest pain and leg swelling  Gastrointestinal: Negative for abdominal pain, blood in stool, diarrhea, nausea and vomiting  Genitourinary: Positive for vaginal bleeding  Negative for dysuria and flank pain  Musculoskeletal: Negative for back pain, neck pain and neck stiffness  Skin: Negative for pallor and rash  Allergic/Immunologic: Negative for environmental allergies and immunocompromised state  Neurological: Negative for dizziness and headaches  Hematological: Negative for adenopathy  Does not bruise/bleed easily  Psychiatric/Behavioral: Negative for agitation and behavioral problems  All other systems reviewed and are negative  Physical Exam  Physical Exam   Constitutional: She is oriented to person, place, and time  She appears well-developed and well-nourished  No distress  HENT:   Head: Normocephalic and atraumatic  Eyes: EOM are normal    Neck: Normal range of motion  Neck supple     Cardiovascular: Normal rate, regular rhythm, normal heart sounds and intact distal pulses  Pulmonary/Chest: Effort normal and breath sounds normal    Abdominal: Soft  Bowel sounds are normal  There is no tenderness  There is no rebound and no guarding  Musculoskeletal: Normal range of motion  Neurological: She is alert and oriented to person, place, and time  Skin: Skin is warm and dry  Psychiatric: She has a normal mood and affect  Nursing note and vitals reviewed        Vital Signs  ED Triage Vitals   Temperature Pulse Respirations Blood Pressure SpO2   03/05/20 1156 03/05/20 1156 03/05/20 1156 03/05/20 1156 03/05/20 1156   98 5 °F (36 9 °C) 79 20 132/61 98 %      Temp Source Heart Rate Source Patient Position - Orthostatic VS BP Location FiO2 (%)   03/05/20 1156 03/05/20 1156 03/05/20 1156 03/05/20 1355 --   Temporal Monitor Sitting Left arm       Pain Score       03/05/20 1156       8           Vitals:    03/05/20 1156 03/05/20 1355   BP: 132/61 108/53   Pulse: 79 65   Patient Position - Orthostatic VS: Sitting Lying         Visual Acuity      ED Medications  Medications   acetaminophen (TYLENOL) tablet 650 mg (650 mg Oral Given 3/5/20 1352)       Diagnostic Studies  Results Reviewed     Procedure Component Value Units Date/Time    Quantitative hCG [[id number]]  (Abnormal) Collected:  03/05/20 1237    Lab Status:  Final result Specimen:  Blood from Arm, Right Updated:  03/05/20 1346     HCG, Quant 93,937 2 mIU/mL     Narrative:        Expected Ranges:     Approximate               Approximate HCG  Gestation age          Concentration ( mIU/mL)  _____________          ______________________   Viola Mealy                      HCG values  0 2-1                       5-50  1-2                           2-3                         100-5000  3-4                         500-57259  4-5                         1000-09497  5-6                         [id number]  6-8                         [id number]  8-12                        [id number] Urine Microscopic [[id number]]  (Abnormal) Collected:  03/05/20 1207    Lab Status:  Final result Specimen:  Urine, Clean Catch Updated:  03/05/20 1317     RBC, UA 30-50 /hpf      WBC, UA 1-2 /hpf      Epithelial Cells Occasional /hpf      Bacteria, UA Moderate /hpf      MUCUS THREADS Occasional    Comprehensive metabolic panel [[id number]]  (Abnormal) Collected:  03/05/20 1237    Lab Status:  Final result Specimen:  Blood from Arm, Right Updated:  03/05/20 1306     Sodium 137 mmol/L      Potassium 3 8 mmol/L      Chloride 104 mmol/L      CO2 24 mmol/L      ANION GAP 9 mmol/L      BUN 10 mg/dL      Creatinine 0 65 mg/dL      Glucose 76 mg/dL      Calcium 8 9 mg/dL      AST 15 U/L      ALT 19 U/L      Alkaline Phosphatase 37 U/L      Total Protein 7 1 g/dL      Albumin 3 4 g/dL      Total Bilirubin 0 28 mg/dL      eGFR 115 ml/min/1 73sq m     Narrative:       National Kidney Disease Foundation guidelines for Chronic Kidney Disease (CKD):     Stage 1 with normal or high GFR (GFR > 90 mL/min/1 73 square meters)    Stage 2 Mild CKD (GFR = 60-89 mL/min/1 73 square meters)    Stage 3A Moderate CKD (GFR = 45-59 mL/min/1 73 square meters)    Stage 3B Moderate CKD (GFR = 30-44 mL/min/1 73 square meters)    Stage 4 Severe CKD (GFR = 15-29 mL/min/1 73 square meters)    Stage 5 End Stage CKD (GFR <15 mL/min/1 73 square meters)  Note: GFR calculation is accurate only with a steady state creatinine    CBC and differential [[id number]]  (Abnormal) Collected:  03/05/20 1237    Lab Status:  Final result Specimen:  Blood from Arm, Right Updated:  03/05/20 1243     WBC 12 25 Thousand/uL      RBC 3 95 Million/uL      Hemoglobin 11 9 g/dL      Hematocrit 36 2 %      MCV 92 fL      MCH 30 1 pg      MCHC 32 9 g/dL      RDW 12 6 %      MPV 9 4 fL      Platelets 440 Thousands/uL      nRBC 0 /100 WBCs      Neutrophils Relative 79 %      Immat GRANS % 0 %      Lymphocytes Relative 13 %      Monocytes Relative 7 %      Eosinophils Relative 1 %      Basophils Relative 0 %      Neutrophils Absolute 9 70 Thousands/µL      Immature Grans Absolute 0 05 Thousand/uL      Lymphocytes Absolute 1 60 Thousands/µL      Monocytes Absolute 0 81 Thousand/µL      Eosinophils Absolute 0 06 Thousand/µL      Basophils Absolute 0 03 Thousands/µL     Urine Macroscopic, POC [[id number]]  (Abnormal) Collected:  03/05/20 1207    Lab Status:  Final result Specimen:  Urine Updated:  03/05/20 1208     Color, UA Yellow     Clarity, UA Clear     pH, UA 6 0     Leukocytes, UA Negative     Nitrite, UA Negative     Protein, UA Negative mg/dl      Glucose, UA Negative mg/dl      Ketones, UA Negative mg/dl      Urobilinogen, UA 0 2 E U /dl      Bilirubin, UA Negative     Blood, UA Large     Specific Gravity, UA >=1 030    Narrative:       CLINITEK RESULT                 No orders to display              Procedures  Pelvic Ultrasound  Performed by: Ramu Shi MD  Authorized by: Ramu Shi MD     Procedure date/time:  3/5/2020 1:36 PM  Patient location:  ED and bedside  Other Assisting Provider: No    Procedure details:     Indications: evaluate for IUP      Assess:  Fetal viability    Technique:  Transabdominal obstetric (limited) exam  Uterine findings:     Intrauterine pregnancy: identified      Gestational sac: identified      Yolk sac: identified      Fetal pole: identified      Fetal heart rate: identified      Fetal heart rate (bpm):  167             ED Course  ED Course as of Mar 05 1444   Thu Mar 05, 2020   1328 ABO Grouping: B   1328 Blood group B positive noted  Rh Factor: Positive   1328 Bedside US done,  (please see Image under media tab)  1347 Beta HCG noted  HCG QUANTITATIVE(!): 96,349 5   1347 Stable for discharge, patient requested for Tylenol for headache, does have history of migraines    Advised precautions for threatened miscarriage, close follow-up with her OBGYN at [facility]                                   MDM  Number of Diagnoses or Management Options  Threatened miscarriage:   Vaginal bleeding: new and requires workup  Diagnosis management comments: 50-year-old female, currently 11 weeks pregnant, , follows at [facility], comes with complaints of vaginal bleeding, that started prior to arrival, states that she was at work, did not do anything strenuous, started with sudden vaginal bleeding, did not use any PAD, after coming to the ER, she went to the room and now states that there was blood when she wiped, no saturation of clothes  On exam patient appears anxious, vital signs stable, abdomen is soft nontender  [facility] records reviewed, IUP was documented on 2020  No blood group noted on any recent labs  Will check labs including ABO/Rh, do bedside ultrasound to check fetal heart rate  Amount and/or Complexity of Data Reviewed  Clinical lab tests: ordered and reviewed  Tests in the medicine section of CPT®: reviewed and ordered  Review and summarize past medical records: yes ([facility] records, US results from 2020, single IUP reported)          Disposition  Final diagnoses:   Vaginal bleeding   Threatened miscarriage     Time reflects when diagnosis was documented in both MDM as applicable and the Disposition within this note     Time User Action Codes Description Comment    3/5/2020 12:37 PM Rosalind Zacarias Add [N93 9] Vaginal bleeding     3/5/2020 12:37 PM Rosalind Zacarias Add [O20 0] Threatened miscarriage       ED Disposition     ED Disposition Condition Date/Time Comment    Discharge Stable Thu Mar 5, 2020  1:38 PM Peter Decent discharge to home/self care  Follow-up Information     Follow up With Specialties Details Why Contact Info        PLEASE FOLLOW UP WITH YOUR OBGYN DOCTOR AT [facility]            Discharge Medication List as of 3/5/2020  1:38 PM      CONTINUE these medications which have NOT CHANGED    Details   folic acid (FOLVITE) 788 MCG tablet Take 800 mcg by mouth daily, Historical Med      Melatonin 10 MG TABS Take by mouth as needed, Historical Med      Prenatal Vit-Fe Fumarate-FA (PRENATAL PO) Take by mouth daily, Historical Med      lidocaine (LIDODERM) 5 % Apply 1 patch topically daily Remove & Discard patch within 12 hours or as directed by MD, Starting Sat 6/29/2019, Print      lidocaine (LMX) 4 % cream Apply topically as needed for moderate pain, Starting Sat 6/29/2019, Print      methocarbamol (ROBAXIN) 500 mg tablet Take 1 tablet (500 mg total) by mouth 2 (two) times a day, Starting Sat 6/29/2019, Print      naproxen (NAPROSYN) 500 mg tablet Take 1 tablet (500 mg total) by mouth 2 (two) times a day with meals, Starting Sat 6/29/2019, Print           No discharge procedures on file      PDMP Review     None          ED Provider  Electronically Signed by           Maria Esther Da Silva MD  03/05/20 2836

## 2020-04-16 ENCOUNTER — TELEPHONE (OUTPATIENT)
Dept: NEUROLOGY | Facility: CLINIC | Age: 35
End: 2020-04-16

## 2020-04-16 ENCOUNTER — TELEMEDICINE (OUTPATIENT)
Dept: NEUROLOGY | Facility: CLINIC | Age: 35
End: 2020-04-16
Payer: COMMERCIAL

## 2020-04-16 DIAGNOSIS — M54.2 CERVICALGIA: ICD-10-CM

## 2020-04-16 DIAGNOSIS — G43.009 MIGRAINE WITHOUT AURA AND WITHOUT STATUS MIGRAINOSUS, NOT INTRACTABLE: ICD-10-CM

## 2020-04-16 DIAGNOSIS — R51.9 CHRONIC DAILY HEADACHE: Primary | ICD-10-CM

## 2020-04-16 PROCEDURE — 99204 OFFICE O/P NEW MOD 45 MIN: CPT | Performed by: PSYCHIATRY & NEUROLOGY

## 2020-04-16 RX ORDER — ACETAMINOPHEN 325 MG/1
650 TABLET ORAL EVERY 6 HOURS PRN
COMMUNITY

## 2020-04-16 RX ORDER — POLYETHYLENE GLYCOL 3350 17 G/17G
17 POWDER, FOR SOLUTION ORAL DAILY
COMMUNITY
Start: 2020-04-02 | End: 2021-04-02

## 2020-04-16 RX ORDER — RIBOFLAVIN (VITAMIN B2) 400 MG
1 TABLET ORAL DAILY
Qty: 90 TABLET | Refills: 3 | Status: SHIPPED | OUTPATIENT
Start: 2020-04-16 | End: 2020-05-11

## 2020-04-16 RX ORDER — PNV NO.95/FERROUS FUM/FOLIC AC 28MG-0.8MG
1 TABLET ORAL DAILY
COMMUNITY
End: 2020-04-16 | Stop reason: SDUPTHER

## 2020-04-22 ENCOUNTER — EVALUATION (OUTPATIENT)
Dept: PHYSICAL THERAPY | Age: 35
End: 2020-04-22
Payer: COMMERCIAL

## 2020-04-22 DIAGNOSIS — M54.2 NECK PAIN: Primary | ICD-10-CM

## 2020-04-22 PROCEDURE — 97010 HOT OR COLD PACKS THERAPY: CPT | Performed by: PHYSICAL THERAPIST

## 2020-04-22 PROCEDURE — 97162 PT EVAL MOD COMPLEX 30 MIN: CPT | Performed by: PHYSICAL THERAPIST

## 2020-04-22 PROCEDURE — 97140 MANUAL THERAPY 1/> REGIONS: CPT | Performed by: PHYSICAL THERAPIST

## 2020-04-27 ENCOUNTER — APPOINTMENT (OUTPATIENT)
Dept: PHYSICAL THERAPY | Age: 35
End: 2020-04-27
Payer: COMMERCIAL

## 2020-04-29 ENCOUNTER — APPOINTMENT (OUTPATIENT)
Dept: PHYSICAL THERAPY | Age: 35
End: 2020-04-29
Payer: COMMERCIAL

## 2020-05-02 ENCOUNTER — HOSPITAL ENCOUNTER (OUTPATIENT)
Dept: MRI IMAGING | Facility: HOSPITAL | Age: 35
Discharge: HOME | End: 2020-05-02
Attending: PSYCHIATRY & NEUROLOGY
Payer: COMMERCIAL

## 2020-05-02 DIAGNOSIS — G43.009 MIGRAINE WITHOUT AURA AND WITHOUT STATUS MIGRAINOSUS, NOT INTRACTABLE: ICD-10-CM

## 2020-05-02 DIAGNOSIS — R51.9 CHRONIC DAILY HEADACHE: ICD-10-CM

## 2020-05-02 PROCEDURE — 70551 MRI BRAIN STEM W/O DYE: CPT

## 2020-05-04 ENCOUNTER — TELEPHONE (OUTPATIENT)
Dept: NEUROLOGY | Facility: CLINIC | Age: 35
End: 2020-05-04

## 2020-05-11 ENCOUNTER — TELEMEDICINE (OUTPATIENT)
Dept: NEUROLOGY | Facility: CLINIC | Age: 35
End: 2020-05-11
Payer: COMMERCIAL

## 2020-05-11 VITALS — BODY MASS INDEX: 39.71 KG/M2 | HEIGHT: 67 IN | WEIGHT: 253 LBS

## 2020-05-11 DIAGNOSIS — R51.9 CHRONIC DAILY HEADACHE: Primary | ICD-10-CM

## 2020-05-11 DIAGNOSIS — M54.2 CERVICALGIA: ICD-10-CM

## 2020-05-11 DIAGNOSIS — G43.009 MIGRAINE WITHOUT AURA AND WITHOUT STATUS MIGRAINOSUS, NOT INTRACTABLE: ICD-10-CM

## 2020-05-11 PROCEDURE — 99213 OFFICE O/P EST LOW 20 MIN: CPT | Performed by: PSYCHIATRY & NEUROLOGY

## 2020-07-27 ENCOUNTER — TELEPHONE (OUTPATIENT)
Dept: NEUROLOGY | Facility: CLINIC | Age: 35
End: 2020-07-27

## 2020-07-27 ENCOUNTER — TELEMEDICINE (OUTPATIENT)
Dept: NEUROLOGY | Facility: CLINIC | Age: 35
End: 2020-07-27
Payer: COMMERCIAL

## 2020-07-27 VITALS — BODY MASS INDEX: 42.85 KG/M2 | WEIGHT: 273 LBS | HEIGHT: 67 IN

## 2020-07-27 DIAGNOSIS — G43.009 MIGRAINE WITHOUT AURA AND WITHOUT STATUS MIGRAINOSUS, NOT INTRACTABLE: ICD-10-CM

## 2020-07-27 DIAGNOSIS — R93.89 ABNORMAL MRI: Primary | ICD-10-CM

## 2020-07-27 DIAGNOSIS — Z3A.32 32 WEEKS GESTATION OF PREGNANCY: ICD-10-CM

## 2020-07-27 PROCEDURE — 99214 OFFICE O/P EST MOD 30 MIN: CPT | Performed by: PHYSICIAN ASSISTANT

## 2020-07-27 RX ORDER — CYPROHEPTADINE HYDROCHLORIDE 4 MG/1
4 TABLET ORAL
Qty: 30 TABLET | Refills: 2 | Status: SHIPPED | OUTPATIENT
Start: 2020-07-27 | End: 2020-10-26

## 2020-07-27 RX ORDER — METOCLOPRAMIDE 10 MG/1
10 TABLET ORAL 4 TIMES DAILY
Qty: 20 TABLET | Refills: 2 | Status: SHIPPED | OUTPATIENT
Start: 2020-07-27

## 2020-07-27 NOTE — TELEPHONE ENCOUNTER
LMOM asking patient to call back to assist in scheduling FU and MRI  MRI to be scheduled in Nov after pt delivers baby and FU to be after that  If patient calls back please assist in scheduling    Mailed AVS, lab and MRI scripts to patients home address      ----- Message from Megan Potts PA-C sent at 7/27/2020  4:03 PM EDT -----  Regarding: f/u and MRI  Patient needs MRI brain in 11/2020 after birth of baby and f/u with me after that

## 2020-07-27 NOTE — PATIENT INSTRUCTIONS
Follow up with eye doctor for dilated fundus exam   Referral to physical therapy for daily headaches and to help with the neck pain      Additional Testing:   Neurodiagnostic workup: will plan on repeat MRI Brain with and without contrast for after pregnancy to follow up      Headache/migraine treatment:   - When you have a moderate to severe headache, you should seek rest, initiate relaxation and apply cold compresses to the head       Abortive medications (for immediate treatment of a headache): It is ok to take  acetaminophen if they help your headaches you should limit these to No more than 3 times a week to avoid medication overuse/rebound headaches  Use Reglan 10 mg every 6-8 hours as needed with Tylenol  Limit of 20 a month     Over the counter preventive supplements for headaches/migraines   (to take every day to help prevent headaches - not to take at the time of headache):  [x] Magnesium 400mg daily (If any diarrhea or upset stomach, decrease dose  as tolerated)     Prescription preventive medications for headaches/migraines   (to take every day to help prevent headaches - not to take at the time of headache):  [x] Cyproheptadine 4 mg at bedtime--stop after deliver baby, do not use while breastfeeding     Self-Monitoring:  [x] Headache calendar  Each day roseline a number from 0-10 indicating if there was a headache and how bad it was  This can be used to monitor gradual improvement and is helpful to make medication adjustments  You can do this on paper or there is an IRVIN for a smart phone called "Migraine e Diary"       Lifestyle Recommendations:  [x] SLEEP - Maintain a regular sleep schedule: Adults need at least 7-8 hours of uninterrupted a night  Maintain good sleep hygiene:  Going to bed and waking up at consistent times, avoiding excessive daytime naps, avoiding caffeinated beverages in the evening, avoid excessive stimulation in the evening and generally using bed primarily for sleeping    One hour before bedtime would recommend turning lights down lower, decreasing your activity (may read quietly, listen to music at a low volume)  When you get into bed, should eliminate all technology (no texting, emailing, playing with your phone, iPad or tablet in bed)  [x] HYDRATION - Maintain good hydration  Drink  2L of fluid a day (4 typical small water bottles)  [x] DIET - Maintain good nutrition  In particular don't skip meals and try and eat healthy balanced meals regularly  [x] TRIGGERS - Look for other triggers and avoid them: Limit caffeine to 1-2 cups a day or less  Avoid dietary triggers that you have noticed bring on your headaches (this could include aged cheese, peanuts, MSG, aspartame and nitrates)      Education and Follow-up  [x] Please call with any questions or concerns  Of course if any new concerning symptoms go to the emergency department    [x] Follow up after MRI

## 2020-07-27 NOTE — PROGRESS NOTES
Virtual Brief Visit    Assessment/Plan:    Problem List Items Addressed This Visit     None                Reason for visit is   Chief Complaint   Patient presents with    Virtual Regular Visit    Virtual Brief Visit        Encounter provider Cory Gaitan PA-C    Provider located at [address] [place] [address] [id number] [place] [phone]    Recent Visits  No visits were found meeting these conditions  Showing recent visits within past 7 days and meeting all other requirements     Today's Visits  Date Type Provider Dept   07/27/20 Telemedicine Cory Gaitan PA-C [facility]   Showing today's visits and meeting all other requirements     Future Appointments  No visits were found meeting these conditions  Showing future appointments within next 150 days and meeting all other requirements        After connecting through {AMB VIRTUAL VISIT PFBRJX:54787}, the patient was identified by name and date of birth  Ruddy Molina was informed that this is a telemedicine visit and that the visit is being conducted through {AMB CORONAVIRUS VISIT AAIKNK:16639}  {Telemedicine confidentiality :42758} {Telemedicine participants:88780}  She acknowledged consent and understanding of privacy and security of the platform  The patient has agreed to participate and understands she can discontinue the visit at any time  Patient is aware this is a billable service  Subjective    Ruddy Molina is a 38 y o  female ***    HPI     Past Medical History:   Diagnosis Date    Thyroid cancer [facility])        Past Surgical History:   Procedure Laterality Date    THYROIDECTOMY, PARTIAL  2017       Current Outpatient Medications   Medication Sig Dispense Refill    acetaminophen (TYLENOL) 325 mg tablet Take 650 mg by mouth every 6 (six) hours as needed      folic acid (FOLVITE) 441 MCG tablet Take 800 mcg by mouth daily      magnesium oxide (MAG-OX) 400 mg Take 1 tablet (400 mg total) by mouth daily 90 tablet 3    Prenatal Vit-Fe Fumarate-FA (PRENATAL PO) Take by mouth daily      lidocaine (LIDODERM) 5 % Apply 1 patch topically daily Remove & Discard patch within 12 hours or as directed by MD (Patient not taking: Reported on 3/5/2020) 30 patch 0    lidocaine (LMX) 4 % cream Apply topically as needed for moderate pain (Patient not taking: Reported on 3/5/2020) 30 g 0    Melatonin 10 MG TABS Take by mouth as needed      methocarbamol (ROBAXIN) 500 mg tablet Take 1 tablet (500 mg total) by mouth 2 (two) times a day (Patient not taking: Reported on 3/5/2020) 20 tablet 0    naproxen (NAPROSYN) 500 mg tablet Take 1 tablet (500 mg total) by mouth 2 (two) times a day with meals (Patient not taking: Reported on 3/5/2020) 30 tablet 0    polyethylene glycol (GLYCOLAX) powder Take 17 g by mouth daily       No current facility-administered medications for this visit  No Known Allergies    Review of Systems   Constitutional: Negative  Negative for appetite change and fever  HENT: Positive for tinnitus (no rings, just bothersome pain, right)  Negative for hearing loss, trouble swallowing and voice change  Eyes: Negative  Negative for photophobia and pain  Respiratory: Negative  Negative for shortness of breath  Cardiovascular: Negative  Negative for palpitations  Gastrointestinal: Negative  Negative for nausea and vomiting  Acid reflux    Endocrine: Negative  Negative for cold intolerance  Genitourinary: Negative  Negative for dysuria, frequency and urgency  Musculoskeletal: Positive for neck pain (both sides)  Negative for myalgias  Skin: Negative  Negative for rash  Neurological: Positive for dizziness (at times, but more frequent), weakness (both legs ), light-headedness, numbness (both hands and legs) and headaches (headaches continue, 2 to 3 times a day and during the week, 4 days out of 7 days )   Negative for tremors, seizures, syncope, facial asymmetry and speech difficulty  Hematological: Bruises/bleeds easily  Psychiatric/Behavioral: Positive for sleep disturbance (falling asleep)  Negative for confusion and hallucinations  Vitals:    07/27/20 1525   Weight: 124 kg (273 lb)   Height: 5' 7" (1 702 m)         {covid time spent:26043}    VIRTUAL VISIT DISCLAIMER    Petrona Phelps acknowledges that she has consented to an online visit or consultation  She understands that the online visit is based solely on information provided by her, and that, in the absence of a face-to-face physical evaluation by the physician, the diagnosis she receives is both limited and provisional in terms of accuracy and completeness  This is not intended to replace a full medical face-to-face evaluation by the physician  Petrona Phelps understands and accepts these terms

## 2020-07-27 NOTE — PROGRESS NOTES
Virtual Regular Visit      Assessment/Plan:    Problem List Items Addressed This Visit        Cardiovascular and Mediastinum    Migraine without aura and without status migrainosus, not intractable    Relevant Medications    cyproheptadine (PERIACTIN) 4 mg tablet    metoclopramide (REGLAN) 10 mg tablet    Other Relevant Orders    MRI brain with and without contrast    BUN    Creatinine, serum       Other    32 weeks gestation of pregnancy      Other Visit Diagnoses     Abnormal MRI    -  Primary    Relevant Medications    cyproheptadine (PERIACTIN) 4 mg tablet    metoclopramide (REGLAN) 10 mg tablet    Other Relevant Orders    MRI brain with and without contrast    BUN    Creatinine, serum               Reason for visit is   Chief Complaint   Patient presents with    Virtual Regular Visit    Virtual Brief Visit        [facility]    Provider located at [address] [place] [id number]      Recent Visits  No visits were found meeting these conditions  Showing recent visits within past 7 days and meeting all other requirements     Today's Visits  Date Type Provider Dept   07/27/20 [facility]  [facility]   Showing today's visits and meeting all other requirements     Future Appointments  No visits were found meeting these conditions  Showing future appointments within next 150 days and meeting all other requirements        The patient was identified by name and date of birth  Isaac Canela was informed that this is a telemedicine visit and that the visit is being conducted through telephone  My office door was closed  No one else was in the room  She acknowledged consent and understanding of privacy and security of the video platform  The patient has agreed to participate and understands they can discontinue the visit at any time      Patient is aware this is a billable service  [employer] # [id number] used for visit    [facility] Consult  PATIENT:  Maddy Monroy  MRN:  [id number]  :  1985  DATE OF SERVICE:  2020  REFERRED BY: No ref  provider found  PMD: Juan Perkins DO  Assessment/Plan:      Juan Carlos Avitia is a very pleasant  87 y o  female with a past medical history that includes migraines, obesity, thyroid cancer 2015 removed, low back pain, chronic headaches referred here for evaluation of headache  My initial evaluation 2020 - 17 5 weeks pregnant  Follow-up 2020-21 weeks pregnant  [facility] Follow up 2020 32 weeks     Chronic daily headache  Migraine without aura and without status migrainosus, not intractable  Cervicalgia  She reports history of headaches for about the past decade   She describes pressure all over the head, more so temporal 1 side or the other often on left more than right and sometimes the back of the head  Rhiannon Donato is a somewhat poor historian regarding her pain   She denies aura reports some associated migrainous features    - frequency as of 2020: since long before pregnancy since 2019, constant pressure in the head, since pregnancy still there every day, varies throughout the day in severity, sometimes moments where it is gone especially if she does nothing   - As of 2020:  She reports that she has had improvement in head pressure on magnesium for prevention  Denies any new or concerning symptoms  She has not yet followed up with eye doctor for dilated fundus exam, I again encouraged her to do this  Physical therapy referral also in place that she could use to help with headaches and neck pain as well      - As of 2020:  She reports that she has had improvement in head pressure  Denies any new or concerning symptoms  She has not yet followed up with eye doctor for dilated fundus exam, I again encouraged her to do this    Physical therapy referral--she could use to help with headaches and neck pain as well  Workup:  - MRI of the brain without contrast 05/02/2020:  1   A few nonspecific white matter lesions noted in the frontal lobes bilaterally, possibly sequela of patient's migraine headaches      Further clinical assessment and follow-up recommended   Recommend surveillance repeat contrast-enhanced MRI of the brain in 3-6 months to assess for stability and to exclude pathologic enhancement  (Very small amount, certainly in the amount that would be expected for migraines)  2   No acute infarction, intracranial hemorrhage or mass effect  - will plan on follow-up MRI brain with without contrast after pregnancy to complete workup, although, low suspicion for other etiologies to nonspecific white matter changes     Preventative:  - referral to physical therapy  - we discussed headache hygiene and lifestyle factors that may improve headaches  - headache preventative supplements magnesium   - add cyproheptadine 4 mg until birth then discontinue  - past/failed: none  - future options: riboflavin,     Abortive:  - discussed not taking over-the-counter or prescription pain medications more than 3 days per week to prevent medication overuse/rebound headache  - add Reglan 10 mg q 6-8 hr with tylenol  - past/failed:  None  - future ondansetron, diphenhydramine, if absolutely needed something like it Triptan or butalbital        Patient instructions      Follow up with eye doctor for dilated fundus exam      Additional Testing:   Neurodiagnostic workup: will plan on repeat MRI Brain with and without contrast for after pregnancy to follow up      Headache/migraine treatment:   - When you have a moderate to severe headache, you should seek rest, initiate relaxation and apply cold compresses to the head       Abortive medications (for immediate treatment of a headache):    It is ok to take  acetaminophen if they help your headaches you should limit these to No more than 3 times a week to avoid medication overuse/rebound headaches  May use Reglan 10mg as needed with migraine  may repeat in 6-8 hours     Over the counter preventive supplements for headaches/migraines   (to take every day to help prevent headaches - not to take at the time of headache):  [x] Magnesium 400mg daily (If any diarrhea or upset stomach, decrease dose  as tolerated)     Prescription preventive medications for headaches/migraines   (to take every day to help prevent headaches - not to take at the time of headache):  [x] not indicated during pregnancy       Self-Monitoring:  [x] Headache calendar  Each day roseline a number from 0-10 indicating if there was a headache and how bad it was   This can be used to monitor gradual improvement and is helpful to make medication adjustments  You can do this on paper or there is an IRVIN for a smart phone called "Migraine e Diary"       Lifestyle Recommendations:  [x] SLEEP - Maintain a regular sleep schedule: Adults need at least 7-8 hours of uninterrupted a night  Maintain good sleep hygiene:  Going to bed and waking up at consistent times, avoiding excessive daytime naps, avoiding caffeinated beverages in the evening, avoid excessive stimulation in the evening and generally using bed primarily for sleeping   One hour before bedtime would recommend turning lights down lower, decreasing your activity (may read quietly, listen to music at a low volume)  When you get into bed, should eliminate all technology (no texting, emailing, playing with your phone, iPad or tablet in bed)  [x] HYDRATION - Maintain good hydration   Drink  2L of fluid a day (4 typical small water bottles)  [x] DIET - Maintain good nutrition  In particular don't skip meals and try and eat healthy balanced meals regularly  [x] TRIGGERS - Look for other triggers and avoid them: Limit caffeine to 1-2 cups a day or less   Avoid dietary triggers that you have noticed bring on your headaches (this could include aged cheese, peanuts, MSG, aspartame and nitrates)      Education and Follow-up  [x] Please call with any questions or concerns  Of course if any new concerning symptoms go to the emergency department  [x] Follow up with Michael Youssef ordered MRI Brain for follow up         CC: We had the pleasure of evaluating Juan Carlos Avitia in neurological follow up today  Juan Carlos Avitia is a 38 y  o    right handed female who presents today for evaluation of headaches       History obtained from patient as well as available medical record review  Eritrean interpretor used over phone   History of Present Illness:   Current medical illnesses include migraines, obesity, thyroid cancer 2015 removed, low back pain, chronic headaches      Currently 32 weeks pregnant     Interval history as of 7/27/2020:   - MRI of the brain without contrast 05/02/2020:  1   A few nonspecific white matter lesions noted in the frontal lobes bilaterally, possibly sequela of patient's migraine headaches      Further clinical assessment and follow-up recommended   Recommend surveillance repeat contrast-enhanced MRI of the brain in 3-6 months to assess for stability and to exclude pathologic enhancement  (Very small amount, certainly in the amount that would be expected for migraines)  2   No acute infarction, intracranial hemorrhage or mass effect      - Will order repeat MRI brain with contrast following pregnancy  - She has not followed up with physical therapy as recommended  - She has not followed up with eye doctor as I recommended for dilated fundus exam - likely due pandemic      Headache - pressure - slightly better having headaches 3-4 days a week    However pain level is usually an 8/10    - taking Headache preventative supplements magnesium    Interval history as of 5/11/2020:   - MRI of the brain without contrast 05/02/2020:  1   A few nonspecific white matter lesions noted in the frontal lobes bilaterally, possibly sequela of patient's migraine headaches      Further clinical assessment and follow-up recommended   Recommend surveillance repeat contrast-enhanced MRI of the brain in 3-6 months to assess for stability and to exclude pathologic enhancement  (Very small amount, certainly in the amount that would be expected for migraines)  2   No acute infarction, intracranial hemorrhage or mass effect      - Will order repeat MRI brain with contrast following pregnancy  - She has not followed up with physical therapy as recommended  - She has not followed up with eye doctor as I recommended for dilated fundus exam - likely due pandemic      Headache - pressure - slightly better   - taking Headache preventative supplements magnesium and not riboflavin        Headaches started at what age? [age] years old  How often do the headaches occur?   - as of 4/16/2020: since long before pregnancy since July 2019, constant pressure in the head, since pregnancy still there every day, varies throughout the day in severity, sometimes moments now where it is gone especially if she does nothing   - As of 05/11/2020: improved a bit on magnesium   -As of 7/27/2020 some improvement in frequency but not intensity     Aura? without aura     Last eye exam: 1 year ago prior to 4/16/2020   Injury to macula in left eye in the past in 2012      Where is your headache located and pain quality?   - pressure all over head    - temporal, one side or the other, more on right side than left   Sometimes on the back of the head  What is the intensity of pain?  Average: 10/10  Associated symptoms:   [x] Nausea - two times      [] Vomiting        [] Diarrhea  [x] Stiff or sore neck - occasionally   [] Problems with concentration  [x] Photophobia     []Phonophobia      [] Osmophobia  [] Blurred vision   [x] Prefer quiet, dark room  [x] Light-headed or dizzy     [] Tinnitus   [] Hands or feet tingle or feel numb/paresthesias  - sometimes if she holds a phone too long      [x] Red face and cheeks     [] Ptosis      [] Facial droop  [] Lacrimation  [] Nasal congestion/rhinorrhea   [] Flushing of face  [] Change in pupil size     Things that make the headache worse? No specific movements, any movement      Headache triggers:  Red sauce makes headache worse     Have you seen someone else for headaches or pain? Yes, PCP  Are you current pregnant or planning on getting pregnant? Currently pregnant   Have you ever had any Brain imaging? no     What medications do you take or have you taken for your headaches?    ABORTIVE:       Currently pregnant - acetaminophen - 3-4 times a day - sometimes it helps   Reglan      PREVENTIVE:   Magnesium  Cyproheptadine        Alternative therapies used in the past for headaches? 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 [address] )        Past Surgical History:   Procedure Laterality Date    THYROIDECTOMY, PARTIAL  2017       Current Outpatient Medications   Medication Sig Dispense Refill    acetaminophen (TYLENOL) 325 mg tablet Take 650 mg by mouth every 6 (six) hours as needed      folic acid (FOLVITE) 014 MCG tablet Take 800 mcg by mouth daily      magnesium oxide (MAG-OX) 400 mg Take 1 tablet (400 mg total) by mouth daily 90 tablet 3    Prenatal Vit-Fe Fumarate-FA (PRENATAL PO) Take by mouth daily      cyproheptadine (PERIACTIN) 4 mg tablet Take 1 tablet (4 mg total) by mouth daily at bedtime as needed for allergies (headaches) Stop after delivery of baby: do not use while breastfeeding 30 tablet 2    lidocaine (LIDODERM) 5 % Apply 1 patch topically daily Remove & Discard patch within 12 hours or as directed by MD (Patient not taking: Reported on 3/5/2020) 30 patch 0    lidocaine (LMX) 4 % cream Apply topically as needed for moderate pain (Patient not taking: Reported on 3/5/2020) 30 g 0    Melatonin 10 MG TABS Take by mouth as needed      methocarbamol (ROBAXIN) 500 mg tablet Take 1 tablet (500 mg total) by mouth 2 (two) times a day (Patient not taking: Reported on 3/5/2020) 20 tablet 0    metoclopramide (REGLAN) 10 mg tablet Take 1 tablet (10 mg total) by mouth 4 (four) times a day 20 tablet 2    naproxen (NAPROSYN) 500 mg tablet Take 1 tablet (500 mg total) by mouth 2 (two) times a day with meals (Patient not taking: Reported on 3/5/2020) 30 tablet 0    polyethylene glycol (GLYCOLAX) powder Take 17 g by mouth daily       No current facility-administered medications for this visit           No Known Allergies      Past Medical History:     Past Medical History:   Diagnosis Date    Thyroid cancer [facility])        Patient Active Problem List   Diagnosis    Chronic daily headache    Migraine without aura and without status migrainosus, not intractable    32 weeks gestation of pregnancy       Medications:      Current Outpatient Medications   Medication Sig Dispense Refill    acetaminophen (TYLENOL) 325 mg tablet Take 650 mg by mouth every 6 (six) hours as needed      folic acid (FOLVITE) 074 MCG tablet Take 800 mcg by mouth daily      magnesium oxide (MAG-OX) 400 mg Take 1 tablet (400 mg total) by mouth daily 90 tablet 3    Prenatal Vit-Fe Fumarate-FA (PRENATAL PO) Take by mouth daily      cyproheptadine (PERIACTIN) 4 mg tablet Take 1 tablet (4 mg total) by mouth daily at bedtime as needed for allergies (headaches) Stop after delivery of baby: do not use while breastfeeding 30 tablet 2    lidocaine (LIDODERM) 5 % Apply 1 patch topically daily Remove & Discard patch within 12 hours or as directed by MD (Patient not taking: Reported on 3/5/2020) 30 patch 0    lidocaine (LMX) 4 % cream Apply topically as needed for moderate pain (Patient not taking: Reported on 3/5/2020) 30 g 0    Melatonin 10 MG TABS Take by mouth as needed      methocarbamol (ROBAXIN) 500 mg tablet Take 1 tablet (500 mg total) by mouth 2 (two) times a day (Patient not taking: Reported on 3/5/2020) 20 tablet 0    metoclopramide (REGLAN) 10 mg tablet Take 1 tablet (10 mg total) by mouth 4 (four) times a day 20 tablet 2    naproxen (NAPROSYN) 500 mg tablet Take 1 tablet (500 mg total) by mouth 2 (two) times a day with meals (Patient not taking: Reported on 3/5/2020) 30 tablet 0    polyethylene glycol (GLYCOLAX) powder Take 17 g by mouth daily       No current facility-administered medications for this visit           Allergies:    No Known Allergies    Family History:     Family History   Problem Relation Age of Onset    Prostate cancer Father     Depression Father     Diabetes Maternal Grandfather     Diabetes Paternal Grandfather        Social History:       Social History     Socioeconomic History    Marital status: Single     Spouse name: Not on file    Number of children: Not on file    Years of education: Not on file    Highest education level: Not on file   Occupational History    Not on file   Social Needs    Financial resource strain: Not on file    Food insecurity:     Worry: Not on file     Inability: Not on file    Transportation needs:     Medical: Not on file     Non-medical: Not on file   Tobacco Use    Smoking status: Never Smoker    Smokeless tobacco: Never Used   Substance and Sexual Activity    Alcohol use: Not Currently    Drug use: Never    Sexual activity: Not on file   Lifestyle    Physical activity:     Days per week: Not on file     Minutes per session: Not on file    Stress: Not on file   Relationships    Social connections:     Talks on phone: Not on file     Gets together: Not on file     Attends Holiness service: Not on file     Active member of club or organization: Not on file     Attends meetings of clubs or organizations: Not on file     Relationship status: Not on file    Intimate partner violence:     Fear of current or ex partner: Not on file     Emotionally abused: Not on file     Physically abused: Not on file     Forced sexual activity: Not on file   Other Topics Concern    Not on file   Social History Narrative    Not on file     Video Exam    There were no vitals filed for this visit  Objective:     Unable to perform    Review of Systems:     Constitutional: Negative  Negative for appetite change and fever  HENT: Positive for tinnitus (no rings, just bothersome pain, right)  Negative for hearing loss, trouble swallowing and voice change  Eyes: Negative  Negative for photophobia and pain  Respiratory: Negative  Negative for shortness of breath  Cardiovascular: Negative  Negative for palpitations  Gastrointestinal: Negative  Negative for nausea and vomiting  Acid reflux    Endocrine: Negative  Negative for cold intolerance  Genitourinary: Negative  Negative for dysuria, frequency and urgency  Musculoskeletal: Positive for neck pain (both sides)  Negative for myalgias  Skin: Negative  Negative for rash  Neurological: Positive for dizziness (at times, but more frequent), weakness (both legs ), light-headedness, numbness (both hands and legs) and headaches (headaches continue, 2 to 3 times a day and during the week, 4 days out of 7 days )  Negative for tremors, seizures, syncope, facial asymmetry and speech difficulty  Hematological: Bruises/bleeds easily  Psychiatric/Behavioral: Positive for sleep disturbance (falling asleep)  Negative for confusion and hallucinations  I have spent 25 minutes with Patient  today in which greater than 50% of this time was spent in counseling/coordination of care regarding Risks and benefits of tx options, Intructions for management, Patient and family education and Importance of tx compliance  VIRTUAL VISIT DISCLAIMER    Rahel Grullon acknowledges that she has consented to an online visit or consultation  She understands that the online visit is based solely on information provided by her, and that, in the absence of a face-to-face physical evaluation by the physician, the diagnosis she receives is both limited and provisional in terms of accuracy and completeness  This is not intended to replace a full medical face-to-face evaluation by the physician  Rahel Grullon understands and accepts these terms        Author:  Jocelyn Shetty PA-C 7/27/2020 4:01 PM

## 2020-08-06 NOTE — TELEPHONE ENCOUNTER
Scheduled MRI for 11/10 at [facility] and fu with Emily Wasserman for 11/30, pt requested late afternoon and 11/30 was next available after MRI    Mailed apt card with date, time and location

## 2020-10-25 DIAGNOSIS — R93.89 ABNORMAL MRI: ICD-10-CM

## 2020-10-25 DIAGNOSIS — G43.009 MIGRAINE WITHOUT AURA AND WITHOUT STATUS MIGRAINOSUS, NOT INTRACTABLE: ICD-10-CM

## 2020-10-26 RX ORDER — CYPROHEPTADINE HYDROCHLORIDE 4 MG/1
TABLET ORAL
Qty: 90 TABLET | Refills: 0 | Status: SHIPPED | OUTPATIENT
Start: 2020-10-26 | End: 2021-01-25

## 2020-11-20 ENCOUNTER — TELEPHONE (OUTPATIENT)
Dept: NEUROLOGY | Facility: CLINIC | Age: 35
End: 2020-11-20

## 2021-01-23 DIAGNOSIS — G43.009 MIGRAINE WITHOUT AURA AND WITHOUT STATUS MIGRAINOSUS, NOT INTRACTABLE: ICD-10-CM

## 2021-01-23 DIAGNOSIS — R93.89 ABNORMAL MRI: ICD-10-CM

## 2021-01-25 DIAGNOSIS — R93.89 ABNORMAL MRI: ICD-10-CM

## 2021-01-25 DIAGNOSIS — G43.009 MIGRAINE WITHOUT AURA AND WITHOUT STATUS MIGRAINOSUS, NOT INTRACTABLE: ICD-10-CM

## 2021-01-25 RX ORDER — CYPROHEPTADINE HYDROCHLORIDE 4 MG/1
4 TABLET ORAL
Qty: 90 TABLET | Refills: 0 | Status: SHIPPED | OUTPATIENT
Start: 2021-01-25

## 2021-01-25 RX ORDER — CYPROHEPTADINE HYDROCHLORIDE 4 MG/1
TABLET ORAL
Qty: 90 TABLET | Refills: 0 | Status: SHIPPED | OUTPATIENT
Start: 2021-01-25 | End: 2021-01-25

## 2021-06-09 RX ORDER — MAGNESIUM OXIDE 400 MG/1
TABLET ORAL
Qty: 90 TABLET | Refills: 2 | OUTPATIENT
Start: 2021-06-09